# Patient Record
Sex: MALE | Race: WHITE | Employment: UNEMPLOYED | URBAN - METROPOLITAN AREA
[De-identification: names, ages, dates, MRNs, and addresses within clinical notes are randomized per-mention and may not be internally consistent; named-entity substitution may affect disease eponyms.]

---

## 2020-09-03 ENCOUNTER — HOSPITAL ENCOUNTER (EMERGENCY)
Age: 20
Discharge: HOME | End: 2020-09-03
Attending: EMERGENCY MEDICINE

## 2020-09-03 VITALS
RESPIRATION RATE: 16 BRPM | TEMPERATURE: 98 F | WEIGHT: 125 LBS | DIASTOLIC BLOOD PRESSURE: 82 MMHG | OXYGEN SATURATION: 100 % | BODY MASS INDEX: 18.51 KG/M2 | HEIGHT: 69 IN | SYSTOLIC BLOOD PRESSURE: 120 MMHG | HEART RATE: 80 BPM

## 2020-09-03 DIAGNOSIS — F11.90 OPIOID USE: Primary | ICD-10-CM

## 2020-09-03 PROCEDURE — 99283 EMERGENCY DEPT VISIT LOW MDM: CPT

## 2020-09-03 NOTE — ED PROVIDER NOTES
27-year-old male sent from half-way for medical clearance because he had Narcan administered 3 hours ago. Patient without complaints. Alert and oriented. No past medical history on file. No past surgical history on file. No family history on file. Social History     Socioeconomic History    Marital status: Not on file     Spouse name: Not on file    Number of children: Not on file    Years of education: Not on file    Highest education level: Not on file   Occupational History    Not on file   Social Needs    Financial resource strain: Not on file    Food insecurity     Worry: Not on file     Inability: Not on file    Transportation needs     Medical: Not on file     Non-medical: Not on file   Tobacco Use    Smoking status: Not on file   Substance and Sexual Activity    Alcohol use: Not on file    Drug use: Not on file    Sexual activity: Not on file   Lifestyle    Physical activity     Days per week: Not on file     Minutes per session: Not on file    Stress: Not on file   Relationships    Social connections     Talks on phone: Not on file     Gets together: Not on file     Attends Muslim service: Not on file     Active member of club or organization: Not on file     Attends meetings of clubs or organizations: Not on file     Relationship status: Not on file    Intimate partner violence     Fear of current or ex partner: Not on file     Emotionally abused: Not on file     Physically abused: Not on file     Forced sexual activity: Not on file   Other Topics Concern    Not on file   Social History Narrative    Not on file         ALLERGIES: Patient has no allergy information on record. Review of Systems   Constitutional: Negative. Negative for fever. HENT: Negative. Negative for drooling, facial swelling and trouble swallowing. Eyes: Negative. Negative for discharge and redness. Respiratory: Negative. Negative for chest tightness, shortness of breath and wheezing. Cardiovascular: Negative. Negative for chest pain. Gastrointestinal: Negative. Negative for abdominal distention, abdominal pain, constipation, diarrhea, nausea and vomiting. Endocrine: Negative. Genitourinary: Negative. Negative for difficulty urinating and dysuria. Musculoskeletal: Negative. Negative for arthralgias and myalgias. Skin: Negative. Negative for color change and rash. Allergic/Immunologic: Negative. Neurological: Negative. Negative for syncope, facial asymmetry and speech difficulty. Hematological: Negative. Psychiatric/Behavioral: Negative. Negative for agitation and confusion. All other systems reviewed and are negative. There were no vitals filed for this visit. Physical Exam  Vitals signs and nursing note reviewed. Constitutional:       Appearance: Normal appearance. He is well-developed. HENT:      Head: Normocephalic and atraumatic. Eyes:      Conjunctiva/sclera: Conjunctivae normal.   Neck:      Musculoskeletal: Neck supple. Cardiovascular:      Rate and Rhythm: Normal rate and regular rhythm. Pulmonary:      Effort: No accessory muscle usage or respiratory distress. Abdominal:      Palpations: Abdomen is soft. Tenderness: There is no abdominal tenderness. Musculoskeletal: Normal range of motion. Skin:     General: Skin is warm and dry. Neurological:      Mental Status: He is alert and oriented to person, place, and time. Psychiatric:         Behavior: Behavior normal.         Thought Content: Thought content normal.          MDM  Number of Diagnoses or Management Options  Opioid use:   Diagnosis management comments: No evidence of acute intoxication. Or other EMC. Patient currently out of the window where we would expect rebound symptoms as Narcan wears off. Additionally, he is to be discharged in the company of law enforcement.            Procedures  LABORATORY TESTS:  No results found for this or any previous visit (from the past 12 hour(s)). IMAGING RESULTS:  No orders to display       MEDICATIONS GIVEN:  Medications - No data to display    IMPRESSION:  1. Opioid use        PLAN:  1. There are no discharge medications for this patient.     2.   Follow-up Information     Follow up With Specialties Details Why [facility] EMERGENCY DEPT Emergency Medicine  As needed, If symptoms worsen Bipin Sabillon        Return to ED if worse

## 2020-09-03 NOTE — DISCHARGE INSTRUCTIONS
Patient Education        Learning About Reducing Your Risk of Opioid Overdose  What is an opioid overdose? Opioids are strong pain medicines. Examples include hydrocodone, oxycodone, fentanyl, and morphine. Heroin is an example of an illegal opioid. Taking too much of any opioid is called an overdose. When this happens, you get very sleepy. Your breathing slows down or stops. This can cause death. Who is most at risk? Your risk rises if you misuse opioids, take high doses, have certain health problems, or if you've overdosed before. You're also at higher risk if you use them with another substance or take illegal opioids, or if you used them regularly and then take them again after you'd cut back or stopped. How can you reduce your risk? Opioids can cause serious problems if you misuse them. They can even cause death. But there are things you can do to help keep yourself safe. · Take medicine only as prescribed. Follow all dose instructions. Never share your medicines with others. · Do not combine opioids with alcohol or other medicines. Opioids can be dangerous if you take them with alcohol or with certain medicines. These medicines include sleeping pills, muscle relaxers, and other medicines that can slow breathing. Tell your doctor about all the medicines you take. Don't start any new medicines before you talk to your doctor or pharmacist.  · Follow your pain management plan. Your plan will include other options to help with pain. They can help lower the amount of an opioid you need. If you don't have a plan, talk with your doctor. You can make one together. · If you have reduced or stopped an opioid, don't return to your old dose. Your body gets used to taking less (or none) of this type of drug. If you suddenly return to taking the same amount that you did before, you are at a higher risk for overdose. · Ask your doctor about a naloxone rescue kit.    Naloxone is used to treat an opioid overdose. It can help save the life of someone who has overdosed. Know the signs of an overdose. Make sure that you and the people close to you know how to use the kit. · Take steps to keep others safe too. Store opioids in a safe and secure place. Make sure that pets, children, friends, and family can't get to them. When you're done using them, get rid of them safely. You can use a local drug take-back program or a mail-back program. If one of these programs isn't available, you can flush skin patches and pills down the toilet. · Talk to your doctor if you're worried about your safety. If you are worried about your safety while taking opioids, or if you're misusing them or taking illegal opioids, talk to your doctor. He or she can help you take steps to stay safe. Your doctor can also connect you to resources to help you stop using opioids. Where can you learn more? Go to http://www.gray.com/  Enter R355 in the search box to learn more about \"Learning About Reducing Your Risk of Opioid Overdose. \"  Current as of: November 20, 2019               Content Version: 12.5  © 8580-3226 [employer]. Care instructions adapted under license by [employer] (which disclaims liability or warranty for this information). If you have questions about a medical condition or this instruction, always ask your healthcare professional. [place] [address] any warranty or liability for your use of this information.

## 2020-09-03 NOTE — ED TRIAGE NOTES
Pt presents to the ED in [employer] custody with concerns for medical clearance for half-way. Officer stated the patient overdosed tonight with a bunch of other people around 12-1 am. Stated someone that was with him gave narcan. Pt has no complaints. Pt is alert, oriented and appropriate. Pt is not drowsy. Pt is drinking water and talking to officer at bedside. 99% on room air. Emergency Department Nursing Plan of Care       The Nursing Plan of Care is developed from the Nursing assessment and Emergency Department Attending provider initial evaluation. The plan of care may be reviewed in the ED Provider note.     The Plan of Care was developed with the following considerations:   Patient / Family readiness to learn indicated by:verbalized understanding  Persons(s) to be included in education: patient  Barriers to Learning/Limitations:No    Signed     Guinean Bras    9/3/2020   4:58 AM

## 2022-05-20 ENCOUNTER — HOSPITAL ENCOUNTER (EMERGENCY)
Age: 22
Discharge: HOME | End: 2022-05-20
Attending: EMERGENCY MEDICINE

## 2022-05-20 VITALS
HEART RATE: 82 BPM | OXYGEN SATURATION: 98 % | WEIGHT: 126.1 LBS | TEMPERATURE: 98.1 F | RESPIRATION RATE: 13 BRPM | HEIGHT: 66 IN | BODY MASS INDEX: 20.27 KG/M2 | SYSTOLIC BLOOD PRESSURE: 135 MMHG | DIASTOLIC BLOOD PRESSURE: 81 MMHG

## 2022-05-20 DIAGNOSIS — T50.901A POLYSUBSTANCE OVERDOSE, ACCIDENTAL OR UNINTENTIONAL, INITIAL ENCOUNTER: Primary | ICD-10-CM

## 2022-05-20 LAB
AMPHET UR QL SCN: POSITIVE
BARBITURATES UR QL SCN: NEGATIVE
BENZODIAZ UR QL: POSITIVE
CANNABINOIDS UR QL SCN: POSITIVE
COCAINE UR QL SCN: POSITIVE
DRUG SCRN COMMENT,DRGCM: ABNORMAL
ETHANOL SERPL-MCNC: <10 MG/DL
METHADONE UR QL: NEGATIVE
OPIATES UR QL: POSITIVE
PCP UR QL: NEGATIVE

## 2022-05-20 PROCEDURE — 99284 EMERGENCY DEPT VISIT MOD MDM: CPT

## 2022-05-20 PROCEDURE — 82077 ASSAY SPEC XCP UR&BREATH IA: CPT

## 2022-05-20 PROCEDURE — 74011250636 HC RX REV CODE- 250/636: Performed by: EMERGENCY MEDICINE

## 2022-05-20 PROCEDURE — 80307 DRUG TEST PRSMV CHEM ANLYZR: CPT

## 2022-05-20 RX ORDER — NALOXONE HYDROCHLORIDE 4 MG/.1ML
SPRAY NASAL
Qty: 2 EACH | Refills: 2 | Status: SHIPPED | OUTPATIENT
Start: 2022-05-20

## 2022-05-20 RX ADMIN — SODIUM CHLORIDE 1000 ML: 9 INJECTION, SOLUTION INTRAVENOUS at 06:01

## 2022-05-20 NOTE — ED PROVIDER NOTES
26-year-old male presents ambulatory following a polysubstance overdose. Patient used MDMA, heroin, weed, cocaine, ketamine, Xanax. Patient states, \"I OD it apparently. \"  Patient's friends gave him 8 mg of intranasal Narcan. Patient states he awoke and EMS was trying to place him on a stretcher. He states EMS wanted to transport him to the hospital but he was concerned regarding the bills so he came by private vehicle instead. Patient states he got the Narcan around 4:30 AM.  Last opiate use before now was December. Patient states he is feeling fine. No past medical history on file. No past surgical history on file. No family history on file. Social History     Socioeconomic History    Marital status: SINGLE     Spouse name: Not on file    Number of children: Not on file    Years of education: Not on file    Highest education level: Not on file   Occupational History    Not on file   Tobacco Use    Smoking status: Not on file    Smokeless tobacco: Not on file   Substance and Sexual Activity    Alcohol use: Not on file    Drug use: Not on file    Sexual activity: Not on file   Other Topics Concern    Not on file   Social History Narrative    Not on file     Social Determinants of Health     Financial Resource Strain:     Difficulty of Paying Living Expenses: Not on file   Food Insecurity:     Worried About Running Out of Food in the Last Year: Not on file    Padmini of Food in the Last Year: Not on file   Transportation Needs:     Lack of Transportation (Medical): Not on file    Lack of Transportation (Non-Medical):  Not on file   Physical Activity:     Days of Exercise per Week: Not on file    Minutes of Exercise per Session: Not on file   Stress:     Feeling of Stress : Not on file   Social Connections:     Frequency of Communication with Friends and Family: Not on file    Frequency of Social Gatherings with Friends and Family: Not on file    Attends Sikhism Services: Not on file    Active Member of Clubs or Organizations: Not on file    Attends Club or Organization Meetings: Not on file    Marital Status: Not on file   Intimate Partner Violence:     Fear of Current or Ex-Partner: Not on file    Emotionally Abused: Not on file    Physically Abused: Not on file    Sexually Abused: Not on file   Housing Stability:     Unable to Pay for Housing in the Last Year: Not on file    Number of [place] in the Last Year: Not on file    Unstable Housing in the Last Year: Not on file         ALLERGIES: Patient has no known allergies. Review of Systems   Constitutional: Positive for activity change. Negative for fever. HENT: Negative. Negative for drooling, facial swelling and trouble swallowing. Eyes: Negative. Negative for discharge and redness. Respiratory: Negative. Negative for chest tightness, shortness of breath and wheezing. Cardiovascular: Negative. Negative for chest pain. Gastrointestinal: Negative. Negative for abdominal distention, abdominal pain, constipation, diarrhea, nausea and vomiting. Endocrine: Negative. Genitourinary: Negative. Negative for difficulty urinating and dysuria. Musculoskeletal: Negative. Negative for arthralgias and myalgias. Skin: Negative. Negative for color change and rash. Allergic/Immunologic: Negative. Neurological: Negative. Negative for syncope, facial asymmetry and speech difficulty. Hematological: Negative. Psychiatric/Behavioral: Negative. Negative for agitation and confusion. All other systems reviewed and are negative. Vitals:    05/20/22 0542   BP: (!) 151/99   Pulse: 96   Resp: 18   Temp: 98.1 °F (36.7 °C)   SpO2: 98%   Weight: 57.2 kg (126 lb 1.6 oz)   Height: 5' 6\" (1.676 m)            Physical Exam  Vitals and nursing note reviewed. Constitutional:       Appearance: Normal appearance. He is well-developed. HENT:      Head: Normocephalic and atraumatic.    Eyes: Conjunctiva/sclera: Conjunctivae normal.   Cardiovascular:      Rate and Rhythm: Normal rate and regular rhythm. Pulmonary:      Effort: No accessory muscle usage or respiratory distress. Abdominal:      Palpations: Abdomen is soft. Tenderness: There is no abdominal tenderness. Musculoskeletal:         General: Normal range of motion. Cervical back: Neck supple. Skin:     General: Skin is warm and dry. Neurological:      Mental Status: He is alert and oriented to person, place, and time. Psychiatric:         Behavior: Behavior normal.         Thought Content: Thought content normal.          MDM  Number of Diagnoses or Management Options  Polysubstance overdose, accidental or unintentional, initial encounter  Diagnosis management comments: Polysubstance overdose. Plan: Supportive care and observation until 7:30 AM.         Procedures    LABORATORY TESTS:  Recent Results (from the past 12 hour(s))   DRUG SCREEN, URINE    Collection Time: 05/20/22  6:03 AM   Result Value Ref Range    AMPHETAMINES Positive (A) NEG      BARBITURATES Negative NEG      BENZODIAZEPINES Positive (A) NEG      COCAINE Positive (A) NEG      METHADONE Negative NEG      OPIATES Positive (A) NEG      PCP(PHENCYCLIDINE) Negative NEG      THC (TH-CANNABINOL) Positive (A) NEG      Drug screen comment (NOTE)        IMAGING RESULTS:  No orders to display       MEDICATIONS GIVEN:  Medications   sodium chloride 0.9 % bolus infusion 1,000 mL (1,000 mL IntraVENous New Bag 5/20/22 0601)       IMPRESSION:  1. Polysubstance overdose, accidental or unintentional, initial encounter        PLAN:  1. Current Discharge Medication List      START taking these medications    Details   naloxone (Narcan) 4 mg/actuation nasal spray Use 1 spray intranasally, then discard. Repeat with new spray every 2 min as needed for opioid overdose symptoms, alternating nostrils. Qty: 2 Each, Refills: 2  Start date: 5/20/2022           2.    Follow-up Information     Follow up With Specialties Details Why [address] - out-patient addiction medicine  Schedule an appointment as soon as possible for a visit   Kirill Walls, [address], [address]  [phone]    [facility] EMERGENCY DEPT Emergency Medicine  As needed, If symptoms worsen [phone]        Return to ED if worse

## 2022-05-20 NOTE — ED TRIAGE NOTES
Concern for drug overdose starting a few hours ago. Pt reports drug addiction but was given narcan at around 4am due to an overdose. Pt reports ketamine, heroin, fentanyl, xanax.  Pt alert and oreinted x4